# Patient Record
Sex: MALE | Race: WHITE | NOT HISPANIC OR LATINO | Employment: STUDENT | URBAN - METROPOLITAN AREA
[De-identification: names, ages, dates, MRNs, and addresses within clinical notes are randomized per-mention and may not be internally consistent; named-entity substitution may affect disease eponyms.]

---
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
## 2022-07-17 ENCOUNTER — HEALTH MAINTENANCE LETTER (OUTPATIENT)
Age: 22
End: 2022-07-17

## 2022-09-25 ENCOUNTER — HEALTH MAINTENANCE LETTER (OUTPATIENT)
Age: 22
End: 2022-09-25

## 2023-08-05 ENCOUNTER — HEALTH MAINTENANCE LETTER (OUTPATIENT)
Age: 23
End: 2023-08-05

## 2023-08-17 ENCOUNTER — TELEPHONE (OUTPATIENT)
Dept: INTERNAL MEDICINE | Facility: CLINIC | Age: 23
End: 2023-08-17

## 2023-08-17 NOTE — TELEPHONE ENCOUNTER
Spoke with patient and assisted with rescheduling with [facility] has an open panel.

## 2023-08-17 NOTE — TELEPHONE ENCOUNTER
----- Message from Frank Bolivar MD sent at 8/17/2023  7:00 AM CDT -----  This is a new pt.  Not seen in years here.  I am not taking new pts.  I am happy to see him today to give him vaccines, but I will not have time for a school physical, and we need to know what vaccines he needs, to make sure we have them here.  Please contact to clarify.  Thanks!

## 2023-08-18 ENCOUNTER — OFFICE VISIT (OUTPATIENT)
Dept: FAMILY MEDICINE | Facility: CLINIC | Age: 23
End: 2023-08-18
Payer: COMMERCIAL

## 2023-08-18 ENCOUNTER — TELEPHONE (OUTPATIENT)
Dept: NURSING | Facility: CLINIC | Age: 23
End: 2023-08-18

## 2023-08-18 VITALS
RESPIRATION RATE: 14 BRPM | SYSTOLIC BLOOD PRESSURE: 124 MMHG | BODY MASS INDEX: 24.11 KG/M2 | HEIGHT: 69 IN | WEIGHT: 162.8 LBS | DIASTOLIC BLOOD PRESSURE: 58 MMHG | HEART RATE: 77 BPM | OXYGEN SATURATION: 99 % | TEMPERATURE: 97.1 F

## 2023-08-18 DIAGNOSIS — Z23 MENINGOCOCCAL GROUP B VACCINE ADMINISTERED: Primary | ICD-10-CM

## 2023-08-18 DIAGNOSIS — Z28.21 HUMAN PAPILLOMA VIRUS (HPV) VACCINATION DECLINED: ICD-10-CM

## 2023-08-18 DIAGNOSIS — Z28.21 COVID-19 VACCINATION DECLINED: ICD-10-CM

## 2023-08-18 DIAGNOSIS — Z23 MENINGOCOCCAL VACCINATION ADMINISTERED AT CURRENT VISIT: ICD-10-CM

## 2023-08-18 PROCEDURE — 90620 MENB-4C VACCINE IM: CPT | Performed by: NURSE PRACTITIONER

## 2023-08-18 PROCEDURE — 99202 OFFICE O/P NEW SF 15 MIN: CPT | Mod: 25 | Performed by: NURSE PRACTITIONER

## 2023-08-18 PROCEDURE — 90460 IM ADMIN 1ST/ONLY COMPONENT: CPT | Performed by: NURSE PRACTITIONER

## 2023-08-18 PROCEDURE — 90619 MENACWY-TT VACCINE IM: CPT | Performed by: NURSE PRACTITIONER

## 2023-08-18 ASSESSMENT — PAIN SCALES - GENERAL: PAINLEVEL: NO PAIN (0)

## 2023-08-18 ASSESSMENT — ENCOUNTER SYMPTOMS: NERVOUS/ANXIOUS: 1

## 2023-08-18 NOTE — TELEPHONE ENCOUNTER
Pt calling with concerns about;    Received 2 vaccines today - for college    Pt is checking [place] vaccination site and states one of the vaccines was not entered onto the website.  Missing 1st dose of B vaccine (?)    Pt reports that he needs vaccines he had today to entered for school by 8/24/23.    Msg routed to clinic Nurse pool.    Brina Rogel RN, Nurse Advisor 2:59 PM 8/18/2023

## 2023-08-18 NOTE — PROGRESS NOTES
Assessment & Plan     Meningococcal group B vaccine administered  - MENINGOCOCCAL B 10-25Y (BEXSERO )   - 2nd Bexsero vaccine in 4 weeks- f/up with a medical provider that is closer to [place] location.    Meningococcal vaccination administered at current visit  - MENINGOCOCCAL ACWY >2Y (MENQUADFI )    COVID-19 vaccination declined    Human papilloma virus (HPV) vaccination declined    I provided face to face vaccine counseling, answered questions, and explained the benefits and risks of the vaccine components ordered today including:  Meningococcal ACYW and Meningococcal B  Strongly recommended HPV and Covid-19 vaccine- He will think about and get it in the future.                 MEKHI Guthrie CNP  [facility]    Subjective   Eddi is a 23 year old, presenting for the following health issues:  Imm/Inj (Update vaccination record. ) and Establish Care    Eddi presents accompanied by father. He will be going to College and needs immunizations against meningitis. Feeling well, no recent illness or fever. No previous reactions to vaccines but he has hx of fainting with medical procedures/needles/blood- and is nervous about getting the vaccines.  He is also overdue for 2nd HPV and Covid-19 booster which he declines today.        8/18/2023    10:57 AM   Additional Questions   Roomed by Barbie MOSS   Accompanied by Father       History of Present Illness       Reason for visit:  Vaccinations    He eats 2-3 servings of fruits and vegetables daily.He consumes 0 sweetened beverage(s) daily.He exercises with enough effort to increase his heart rate 60 or more minutes per day.  He exercises with enough effort to increase his heart rate 5 days per week.   He is taking medications regularly.               Review of Systems   Psychiatric/Behavioral:  The patient is nervous/anxious.    All other systems reviewed and are negative.           Objective    /58 (BP Location: Left arm,  "Patient Position: Sitting, Cuff Size: Adult Regular)   Pulse 77   Temp 97.1  F (36.2  C) (Tympanic)   Resp 14   Ht 1.746 m (5' 8.75\")   Wt 73.8 kg (162 lb 12.8 oz)   SpO2 99%   BMI 24.22 kg/m    Body mass index is 24.22 kg/m .  Physical Exam   GENERAL: healthy, alert and no distress  SKIN: no suspicious lesions or rashes  PSYCH: mentation appears normal, affect normal/bright. Nervous.                       "

## 2023-08-21 NOTE — TELEPHONE ENCOUNTER
Per chart review, vaccines given are now viewable on [facility].    MenACWY 08/18/2023  MenQuadfi Full   No   MenB 08/18/2023 1 of 2 Bexsero Full   No

## 2024-09-28 ENCOUNTER — HEALTH MAINTENANCE LETTER (OUTPATIENT)
Age: 24
End: 2024-09-28